# Patient Record
Sex: MALE | Race: WHITE | NOT HISPANIC OR LATINO | URBAN - METROPOLITAN AREA
[De-identification: names, ages, dates, MRNs, and addresses within clinical notes are randomized per-mention and may not be internally consistent; named-entity substitution may affect disease eponyms.]

---

## 2017-01-04 ENCOUNTER — EMERGENCY (EMERGENCY)
Facility: HOSPITAL | Age: 37
LOS: 1 days | Discharge: HOME | End: 2017-01-04
Admitting: EMERGENCY MEDICINE
Payer: MEDICARE

## 2017-01-04 VITALS
RESPIRATION RATE: 18 BRPM | OXYGEN SATURATION: 100 % | DIASTOLIC BLOOD PRESSURE: 88 MMHG | WEIGHT: 167.99 LBS | TEMPERATURE: 98 F | SYSTOLIC BLOOD PRESSURE: 131 MMHG | HEIGHT: 69 IN | HEART RATE: 102 BPM

## 2017-01-04 PROCEDURE — 90792 PSYCH DIAG EVAL W/MED SRVCS: CPT | Mod: GC

## 2017-01-04 PROCEDURE — 99283 EMERGENCY DEPT VISIT LOW MDM: CPT

## 2017-01-04 RX ORDER — CLONAZEPAM 1 MG
0.5 TABLET ORAL ONCE
Qty: 0 | Refills: 0 | Status: DISCONTINUED | OUTPATIENT
Start: 2017-01-04 | End: 2017-01-04

## 2017-01-04 RX ADMIN — Medication 0.5 MILLIGRAM(S): at 22:41

## 2017-01-04 NOTE — ED BEHAVIORAL HEALTH NOTE - BEHAVIORAL HEALTH NOTE
Cyril, , [phone], reports that patient has a history of anxiety and panic attacks, but in the past 3 week has actually been doing very well from Leon's point of view. He states that in recent times, he and patient enjoyed the holidays with friends and family, states patient has been eating well/sleeping well/caring for ADLs as per usual. He states patient has been attending work shifts and has not had problems with functioning. He denies patient threatening suicide or engaging in any self-harm, "he's not like that". He denies patient threatening to harm others or being aggressive at any time. He states patient's primary issue is his anxiety and states that today patient had a panic attack for unclear reasons. He states patient called his psychologist who advised him to go to the ED. He states patient has a f/u appointment with her on Friday. He adamantly denies safety concerns if patient is discharged. He states he will come and pick patient up shortly.

## 2017-01-04 NOTE — ED BEHAVIORAL HEALTH ASSESSMENT NOTE - CASE SUMMARY
Patient is a 37 y/o employed  male, domiciled with , hx of anxiety, no prior psych hosp/suicidality BIB self for worsening anxiety.  Patient's anxiety improved after receiving 1mg of clonazepam in the ED. There are no acute depressive sxs. No tori. No psychosis. No suicidal/homicidal ideation/plan/intent. Patient feels safe going home. Patient's  denies any acute safety concerns. Patient is not an imminent threat to himself/others at this time and will be discharged.
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
## 2017-01-04 NOTE — ED PROVIDER NOTE - CHPI ED SYMPTOMS NEG
no depression/no hallucinations/no paranoia/no weakness/no weight loss/no sadness/no homicidal/no suicidal

## 2017-01-04 NOTE — ED PROVIDER NOTE - OBJECTIVE STATEMENT
The patient is a 36y Male hx spinal fusion- L1-L4, anxiety diagnosed at age 13 presents to ed for worsening anxiety.  Pt denies self inflicted injuries, trauma or falls, no headache, back or neck pain, no nausea or vomiting, abd pain, no fever or chills, no cp or sob, no palpitation or diaphoresis.  Denies etoh or illicit drugs, no SI/HI, no AVH.  Endorsed no other symptoms.

## 2017-01-04 NOTE — ED BEHAVIORAL HEALTH ASSESSMENT NOTE - SUMMARY
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 Patient presents with significant anxiety and panic attacks in setting of multiple stressors. However he is future oriented and help-seeking, having come to the ED in hopes of hastening connection to psychiatric care. He denies SI/I/P or HI/I/P, and admits to finding significant support in his new therapist.  Patient does not present an acute risk to himself or others at this time and can be safely discharged home, to follow up with therapist in 2 days. Patient is in agreement with plan to call her in the morning, as well as seek care using list provided. 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 Patient does not meet criteria for CHAMP or panic disorder but presents with significant anxiety and panic attacks in setting of multiple stressors. However, he is future oriented and help-seeking, having come to the ED in hopes of hastening connection to psychiatric care. He denies SI/I/P or HI/I/P, and admits to finding significant support in his new therapist.  Patient does not present an acute risk to himself or others at this time and can be safely discharged home, to follow up with therapist in 2 days. Patient is in agreement with plan to call her in the morning, as well as seek care using list provided.

## 2017-01-04 NOTE — ED BEHAVIORAL HEALTH ASSESSMENT NOTE - SUICIDE PROTECTIVE FACTORS
Engaged in work or school/Positive therapeutic relationships/Supportive social network or family/Identifies reasons for living/Future oriented

## 2017-01-04 NOTE — ED BEHAVIORAL HEALTH ASSESSMENT NOTE - DESCRIPTION
Calm and cooperative but anxious S/p back surgery Pt currently lives in [facility] with  of 2 years. States that he has regret about leaving past relationship in [place], but still "gets along" with . Pt has two older sister in [place], and one who passed away. Calm and cooperative but visibly anxious.

## 2017-01-04 NOTE — ED BEHAVIORAL HEALTH ASSESSMENT NOTE - RISK ASSESSMENT
Risk factors for suicide include anxiety, insomnia, recent trauma, relationship stressors and partial unemployment/financial difficulties. Protective factors include absence of SI/I/P past or present, help-seeking behavior, therapeutic alliance, , and future orientation. Patient does not present an acute risk to himself or others at this time and can be safely discharged.

## 2017-01-04 NOTE — ED BEHAVIORAL HEALTH ASSESSMENT NOTE - OTHER PAST PSYCHIATRIC HISTORY (INCLUDE DETAILS REGARDING ONSET, COURSE OF ILLNESS, INPATIENT/OUTPATIENT TREATMENT)
H/o anxiety treated by PMD; Klonopin > 3 years ago and Ativan until 1 month ago. No psych hospitalizations. Never seen by psychiatrist. Recently began seeing JESSICA Jaime in [facility]. H/o anxiety treated by PMD; Klonopin > 3 years ago in [place] (good response) and Ativan until 1 month ago (too drowsy). No psych hospitalizations. Never seen by psychiatrist. Recently began seeing JESSICA Jaime in [facility].

## 2017-01-04 NOTE — ED ADULT NURSE NOTE - CHIEF COMPLAINT QUOTE
Pt walk in claimed having Anxiety Attack for  2 weeks constantly. Got nervous when surrounded by people. Pt claimed seen a Psycologist not on any medication  prior Kiko. Called her and advised to see a Psychiatrist for further eval. denies Alcohol intake or drug used SI HI Hearing voices. Pt is calm and cooperative in Triage  MD made aware.

## 2017-01-04 NOTE — ED ADULT TRIAGE NOTE - CHIEF COMPLAINT QUOTE
Pt walk in claimed having Anxiety Attack for  2 weeks constantly. Got nervous when sorrounded by people. Pt claimed seen a Psycologist not on any medication  prior Kiko. Called her and advised to see a Psychiatrist for further eval. denies Alsohol intake or drug used SI HI Hearing voices Pt walk in claimed having Anxiety Attack for  2 weeks constantly. Got nervous when surrounded by people. Pt claimed seen a Psycologist not on any medication  prior Kiko. Called her and advised to see a Psychiatrist for further eval. denies Alcohol intake or drug used SI HI Hearing voices. Pt is calm and cooperative in Triage  MD made aware.

## 2017-01-04 NOTE — ED PROVIDER NOTE - MEDICAL DECISION MAKING DETAILS
36y Male hx anxiety diagnosed at age 13, spinal fusion- L1-L4 presents to ed for worsening anxiety.  Pt is well appearing no evidence of self inflicted injuries/mutilation on exam- Will dispo as per psych-

## 2017-01-05 DIAGNOSIS — F41.9 ANXIETY DISORDER, UNSPECIFIED: ICD-10-CM

## 2017-01-06 DIAGNOSIS — Z98.1 ARTHRODESIS STATUS: Chronic | ICD-10-CM

## 2017-01-18 ENCOUNTER — APPOINTMENT (OUTPATIENT)
Dept: PHYSICAL MEDICINE AND REHAB | Facility: CLINIC | Age: 37
End: 2017-01-18

## 2017-07-27 ENCOUNTER — MEDICATION RENEWAL (OUTPATIENT)
Age: 37
End: 2017-07-27

## 2017-07-27 RX ORDER — CARISOPRODOL 350 MG/1
350 TABLET ORAL TWICE DAILY
Qty: 60 | Refills: 0 | Status: ACTIVE | COMMUNITY

## 2017-10-02 ENCOUNTER — APPOINTMENT (OUTPATIENT)
Dept: INTERNAL MEDICINE | Facility: CLINIC | Age: 37
End: 2017-10-02

## 2022-03-14 ENCOUNTER — TRANSCRIPTION ENCOUNTER (OUTPATIENT)
Age: 42
End: 2022-03-14